# Patient Record
Sex: FEMALE | Race: WHITE | NOT HISPANIC OR LATINO | Employment: FULL TIME | URBAN - METROPOLITAN AREA
[De-identification: names, ages, dates, MRNs, and addresses within clinical notes are randomized per-mention and may not be internally consistent; named-entity substitution may affect disease eponyms.]

---

## 2017-04-07 ENCOUNTER — RADIANT APPOINTMENT (OUTPATIENT)
Dept: GENERAL RADIOLOGY | Facility: CLINIC | Age: 52
End: 2017-04-07
Attending: PHYSICIAN ASSISTANT
Payer: COMMERCIAL

## 2017-04-07 ENCOUNTER — OFFICE VISIT (OUTPATIENT)
Dept: URGENT CARE | Facility: URGENT CARE | Age: 52
End: 2017-04-07
Payer: COMMERCIAL

## 2017-04-07 VITALS
DIASTOLIC BLOOD PRESSURE: 74 MMHG | TEMPERATURE: 97.6 F | HEART RATE: 94 BPM | OXYGEN SATURATION: 97 % | SYSTOLIC BLOOD PRESSURE: 116 MMHG | WEIGHT: 171.8 LBS

## 2017-04-07 DIAGNOSIS — M25.571 ACUTE RIGHT ANKLE PAIN: Primary | ICD-10-CM

## 2017-04-07 DIAGNOSIS — M25.571 ACUTE RIGHT ANKLE PAIN: ICD-10-CM

## 2017-04-07 DIAGNOSIS — S93.401A SPRAIN OF RIGHT ANKLE, UNSPECIFIED LIGAMENT, INITIAL ENCOUNTER: ICD-10-CM

## 2017-04-07 PROCEDURE — 99213 OFFICE O/P EST LOW 20 MIN: CPT | Performed by: PHYSICIAN ASSISTANT

## 2017-04-07 PROCEDURE — 73610 X-RAY EXAM OF ANKLE: CPT | Mod: RT

## 2017-04-07 NOTE — NURSING NOTE
Chief Complaint   Patient presents with     Foot Problems     Rt ankle injury x this morning        Initial /74 (BP Location: Left arm, Patient Position: Chair, Cuff Size: Adult Regular)  Pulse 94  Temp 97.6  F (36.4  C) (Oral)  Wt 171 lb 12.8 oz (77.9 kg)  SpO2 97% There is no height or weight on file to calculate BMI.  Medication Reconciliation: complete

## 2017-04-07 NOTE — MR AVS SNAPSHOT
"              After Visit Summary   2017    Jodi Leiva    MRN: [id number]           Patient Information     Date Of Birth          1965        Visit Information        Provider Department      2017 4:30 PM Michele Gerber, FERMÍN [facility]        Today's Diagnoses     Acute right ankle pain    -  1    Sprain of right ankle, unspecified ligament, initial encounter           Follow-ups after your visit        Who to contact     If you have questions or need follow up information about today's clinic visit or your schedule please contact [facility] directly at [phone].  Normal or non-critical lab and imaging results will be communicated to you by [employer], letter or phone within 4 business days after the clinic has received the results. If you do not hear from us within 7 days, please contact the clinic through General [employer] or phone. If you have a critical or abnormal lab result, we will notify you by phone as soon as possible.  Submit refill requests through [employer] or call your pharmacy and they will forward the refill request to us. Please allow 3 business days for your refill to be completed.          Additional Information About Your Visit        MyChart Information     [employer] lets you send messages to your doctor, view your test results, renew your prescriptions, schedule appointments and more. To sign up, go to [email or web address] . Click on \"Log in\" on the left side of the screen, which will take you to the Welcome page. Then click on \"Sign up Now\" on the right side of the page.     You will be asked to enter the access code listed below, as well as some personal information. Please follow the directions to create your username and password.     Your access code is: [email or web address]  Expires: 2017  9:11 AM     Your access code will  in 90 days. If you need help or a new code, please call your [place] clinic or [phone].   "      Care EveryWhere ID     This is your Care EveryWhere ID. This could be used by other organizations to access your [place] medical records  [id number]        Your Vitals Were     Pulse Temperature Pulse Oximetry             94 97.6  F (36.4  C) (Oral) 97%          Blood Pressure from Last 3 Encounters:   04/07/17 116/74    Weight from Last 3 Encounters:   04/07/17 171 lb 12.8 oz (77.9 kg)                 Today's Medication Changes          These changes are accurate as of: 4/7/17 11:59 PM.  If you have any questions, ask your nurse or doctor.               Start taking these medicines.        Dose/Directions    order for DME   Used for:  Acute right ankle pain, Sprain of right ankle, unspecified ligament, initial encounter   Started by:  Michele Gerber PA-C        Equipment being ordered: right elastic ankle brace   Quantity:  1 Device   Refills:  0            Where to get your medicines      Some of these will need a paper prescription and others can be bought over the counter.  Ask your nurse if you have questions.     Bring a paper prescription for each of these medications     order for DME                Primary Care Provider Office Phone # Fax #    Kenneth G Pallas, [phone]       [facility] [address] [facility] [address]        Thank you!     Thank you for choosing [facility]  for your care. Our goal is always to provide you with excellent care. Hearing back from our patients is one way we can continue to improve our services. Please take a few minutes to complete the written survey that you may receive in the mail after your visit with us. Thank you!             Your Updated Medication List - Protect others around you: Learn how to safely use, store and throw away your medicines at www.disposemymeds.org.          This list is accurate as of: 4/7/17 11:59 PM.  Always use your most recent med list.                   Brand  Name Dispense Instructions for use    AMITRIPTYLINE HCL PO      Take 20 mg by mouth       CITALOPRAM HYDROBROMIDE PO      Take 10 mg by mouth       order for DME     1 Device    Equipment being ordered: right elastic ankle brace       ZANTAC PO

## 2017-04-09 NOTE — PROGRESS NOTES
SUBJECTIVE:  Chief Complaint   Patient presents with     Foot Problems     Rt ankle injury x this morning      Jodi Leiva is a 51 year old female presents with a chief complaint of right ankle pain.  The injury occurred 1 day(s) ago.   The injury happened while playing. How: trauma: immediate pain.  The patient complained of mild pain  and has had decreased ROM.  Pain exacerbated by weight-bearing and movement.  Relieved by rest.  She treated it initially with no therapy. This is the first time this type of injury has occurred to this patient.     PMH:  None    ALLERGIES  No Known Allergies   \  Social History   Substance Use Topics     Smoking status: Never Smoker     Smokeless tobacco: Not on file     Alcohol use Not on file       ROS:  CONSTITUTIONAL:NEGATIVE for fever, chills, change in weight  INTEGUMENTARY/SKIN: POSITIVE for right swelling of ankle  MUSCULOSKELETAL: Positive for right ankle tenderness, pain  NEURO: NEGATIVE for weakness, dizziness or paresthesias    EXAM:   /74 (BP Location: Left arm, Patient Position: Chair, Cuff Size: Adult Regular)  Pulse 94  Temp 97.6  F (36.4  C) (Oral)  Wt 171 lb 12.8 oz (77.9 kg)  SpO2 97%  Gen: healthy,alert,no distress  Extremity: ankle has point tenderness lateral and medial ankel.   There is not compromise to the distal circulation.  Pulses are +2 and CRT is brisk  GENERAL APPEARANCE: healthy, alert and no distress  EXTREMITIES: peripheral pulses normal  MS:  Positive for right ankle tenderness, localize dlain  SKIN: Positive for swelling lateral and medial ankle  NEURO: Normal strength and tone, sensory exam grossly normal, mentation intact and speech normal    X-RAY was done and negative for acute changes including fracture Xray read by Michele Gerber at time of visit    ASSESSMENT/PLAN:      ICD-10-CM    1. Acute right ankle pain M25.571 XR Ankle Right G/E 3 Views     order for DME   2. Sprain of right ankle, unspecified ligament, initial encounter  S93.401A order for DME     RICE treatment: Rest, Ice, compression, elevation   Wear ankle brace  Activity as tolerated

## 2019-12-03 ENCOUNTER — HOSPITAL ENCOUNTER (OUTPATIENT)
Dept: MAMMOGRAPHY | Facility: CLINIC | Age: 54
End: 2019-12-03
Attending: FAMILY MEDICINE
Payer: COMMERCIAL

## 2019-12-03 ENCOUNTER — HOSPITAL ENCOUNTER (OUTPATIENT)
Dept: MAMMOGRAPHY | Facility: CLINIC | Age: 54
Discharge: HOME | End: 2019-12-03
Attending: FAMILY MEDICINE | Admitting: FAMILY MEDICINE
Payer: COMMERCIAL

## 2019-12-03 DIAGNOSIS — N64.59 BREAST THICKENING: ICD-10-CM

## 2019-12-03 DIAGNOSIS — N64.4 BREAST PAIN, LEFT: ICD-10-CM

## 2019-12-03 PROCEDURE — G0279 TOMOSYNTHESIS, MAMMO: HCPCS

## 2019-12-03 PROCEDURE — 77066 DX MAMMO INCL CAD BI: CPT

## 2019-12-03 PROCEDURE — 76642 ULTRASOUND BREAST LIMITED: CPT | Mod: LT

## 2021-04-19 ENCOUNTER — IMMUNIZATION (OUTPATIENT)
Dept: NURSING | Facility: CLINIC | Age: 56
End: 2021-04-19
Payer: COMMERCIAL

## 2021-04-19 PROCEDURE — 91300 PR COVID VAC PFIZER DIL RECON 30 MCG/0.3 ML IM: CPT

## 2021-04-19 PROCEDURE — 0001A PR COVID VAC PFIZER DIL RECON 30 MCG/0.3 ML IM: CPT

## 2021-05-10 ENCOUNTER — IMMUNIZATION (OUTPATIENT)
Dept: NURSING | Facility: CLINIC | Age: 56
End: 2021-05-10
Attending: INTERNAL MEDICINE
Payer: COMMERCIAL

## 2021-05-10 PROCEDURE — 91300 PR COVID VAC PFIZER DIL RECON 30 MCG/0.3 ML IM: CPT

## 2021-05-10 PROCEDURE — 0002A PR COVID VAC PFIZER DIL RECON 30 MCG/0.3 ML IM: CPT

## 2021-05-16 ENCOUNTER — HEALTH MAINTENANCE LETTER (OUTPATIENT)
Age: 56
End: 2021-05-16

## 2021-09-05 ENCOUNTER — HEALTH MAINTENANCE LETTER (OUTPATIENT)
Age: 56
End: 2021-09-05

## 2021-12-15 ENCOUNTER — HOSPITAL ENCOUNTER (EMERGENCY)
Facility: CLINIC | Age: 56
Discharge: HOME | End: 2021-12-15
Attending: EMERGENCY MEDICINE | Admitting: EMERGENCY MEDICINE
Payer: COMMERCIAL

## 2021-12-15 VITALS
TEMPERATURE: 98 F | OXYGEN SATURATION: 99 % | DIASTOLIC BLOOD PRESSURE: 88 MMHG | RESPIRATION RATE: 18 BRPM | WEIGHT: 182.6 LBS | HEART RATE: 83 BPM | SYSTOLIC BLOOD PRESSURE: 123 MMHG

## 2021-12-15 DIAGNOSIS — H43.391 FLOATERS, RIGHT: ICD-10-CM

## 2021-12-15 PROCEDURE — 76512 OPH US DX B-SCAN: CPT | Performed by: EMERGENCY MEDICINE

## 2021-12-15 PROCEDURE — 76512 OPH US DX B-SCAN: CPT | Mod: 26 | Performed by: EMERGENCY MEDICINE

## 2021-12-15 PROCEDURE — 99284 EMERGENCY DEPT VISIT MOD MDM: CPT | Mod: 25 | Performed by: EMERGENCY MEDICINE

## 2021-12-15 ASSESSMENT — VISUAL ACUITY
OS: 20/30;WITH CORRECTIVE LENSES
OD: 20/40;WITH CORRECTIVE LENSES

## 2021-12-15 ASSESSMENT — ENCOUNTER SYMPTOMS
SORE THROAT: 0
FREQUENCY: 0
PALPITATIONS: 0
EYE ITCHING: 0
CHEST TIGHTNESS: 0
SHORTNESS OF BREATH: 0
ABDOMINAL DISTENTION: 0
CONFUSION: 0
COUGH: 0
ARTHRALGIAS: 0
FEVER: 0
NAUSEA: 0
CHILLS: 0
DIZZINESS: 0
ABDOMINAL PAIN: 0
HEADACHES: 0
EYE PAIN: 0
FATIGUE: 0
BACK PAIN: 0
EYE DISCHARGE: 0
NECK PAIN: 0
WEAKNESS: 0
MYALGIAS: 0
DYSURIA: 0
DIFFICULTY URINATING: 0
VOMITING: 0
DIARRHEA: 0
CONSTIPATION: 0
COLOR CHANGE: 0

## 2021-12-16 ENCOUNTER — OFFICE VISIT (OUTPATIENT)
Dept: OPHTHALMOLOGY | Facility: CLINIC | Age: 56
End: 2021-12-16
Attending: OPHTHALMOLOGY
Payer: COMMERCIAL

## 2021-12-16 ENCOUNTER — TELEPHONE (OUTPATIENT)
Dept: OPHTHALMOLOGY | Facility: CLINIC | Age: 56
End: 2021-12-16
Payer: COMMERCIAL

## 2021-12-16 DIAGNOSIS — H43.812 VITREOUS DEGENERATION, LEFT: Primary | ICD-10-CM

## 2021-12-16 PROCEDURE — G0463 HOSPITAL OUTPT CLINIC VISIT: HCPCS

## 2021-12-16 PROCEDURE — 99203 OFFICE O/P NEW LOW 30 MIN: CPT | Mod: GC | Performed by: OPHTHALMOLOGY

## 2021-12-16 ASSESSMENT — VISUAL ACUITY
OD_CC: 20/25
METHOD: SNELLEN - LINEAR
OS_CC: 20/25
OS_CC+: -2
OD_CC+: +2
OU: 1
CORRECTION_TYPE: GLASSES

## 2021-12-16 ASSESSMENT — CUP TO DISC RATIO
OD_RATIO: 0.2
OS_RATIO: 0.2

## 2021-12-16 ASSESSMENT — CONF VISUAL FIELD
METHOD: COUNTING FINGERS
OS_NORMAL: 1
OD_NORMAL: 1

## 2021-12-16 ASSESSMENT — REFRACTION_WEARINGRX
OD_CYLINDER: +1.50
OS_CYLINDER: +0.50
OS_SPHERE: +1.50
OS_ADD: +1.25
OD_ADD: +1.25
OS_AXIS: 013
OD_SPHERE: +1.00
SPECS_TYPE: PAL
OD_AXIS: 138

## 2021-12-16 ASSESSMENT — TONOMETRY
OD_IOP_MMHG: 17
OS_IOP_MMHG: 14
IOP_METHOD: TONOPEN

## 2021-12-16 ASSESSMENT — EXTERNAL EXAM - RIGHT EYE: OD_EXAM: NORMAL

## 2021-12-16 ASSESSMENT — EXTERNAL EXAM - LEFT EYE: OS_EXAM: NORMAL

## 2021-12-16 NOTE — DISCHARGE INSTRUCTIONS
You are being discharged with plan to follow-up in the ophthalmology clinic tomorrow morning. Their contact information is listed below. They should call you to schedule appointment. If you do not hear from them by 10 AM, you should call the number listed below.    Please follow up with Eye Clinic (phone: [phone]) as soon as possible.    [facility]  9th Floor  [place] [phone]

## 2021-12-16 NOTE — TELEPHONE ENCOUNTER
Called pt earlier this AM    Will document in previous encounter    German Duvall RN 8:47 AM 12/16/21          [facility]    Phone Message    May a detailed message be left on voicemail: yes     Reason for Call: Other: Pt called, was in E.R for floaters and is referred over to Eye Clinic to see a specialist. Per protocols, writer is sending TE to schedule Pt. Thank you.      Action Taken: Message routed to:  [facility]    Travel Screening: Not Applicable

## 2021-12-16 NOTE — ED PROVIDER NOTES
"      [facility] EMERGENCY DEPARTMENT ([facility])    12/15/21    ED04      History     Chief Complaint   Patient presents with     Floaters Right Eye     floater R eye for past week, mild eye pain. Every so often feels a \"ripple or curtain\" go across visual field, denied vision change in triage then said yes, vision is blurrier in R eye during visual acuity test.     The history is provided by the patient and medical records.     Jodi Leiva is a 56 year old female who presents with a floater in her right eye for the past few weeks.  She states that on Saturday 12/11/2021 she had a moment where the eye was sharply painful.  She states that the eye is tender and sore underneath it. She denies pain.  She also reports discomfort in the tear trough.  She states that nothing has affected its functionality.  She states that sometimes the floater looks like a curtain drifting across her vision which she can see in a bright environment such as looking at bright computer screen or in the shower.  She feels that her eyes are strained.  The floater is in the upper right part of the eye when looking forward.  She states that her vision is slightly blurry when she closes her left eye.  She denies use of contacts and wears glasses.  She denies blackening of her vision.  She denies drainage or itching.  She denies any chronic problems in that eye and states that this issue is new.  She denies any chronic medical problems.  She states the only medication she takes is an antidepressant (citalopram) and amitriptyline for sleeping.  She reports that she was seen at urgent care today and they told her to come to the ED when they heard about the \"curtain-like\" movements of the floater.        Past Medical History  No past medical history on file.  No past surgical history on file.  AMITRIPTYLINE HCL PO  CITALOPRAM HYDROBROMIDE PO  order for DME      No Known Allergies  Family History  Family History   Problem Relation Age of " Onset     Glaucoma No family hx of      Macular Degeneration No family hx of      Social History   Social History     Tobacco Use     Smoking status: Never Smoker     Smokeless tobacco: Never Used   Substance Use Topics     Alcohol use: Not on file     Drug use: Not on file      Past medical history, past surgical history, medications, allergies, family history, and social history were reviewed with the patient. No additional pertinent items.       Review of Systems   Constitutional: Negative for chills, fatigue and fever.   HENT: Negative for congestion and sore throat.    Eyes: Positive for visual disturbance. Negative for pain, discharge and itching.   Respiratory: Negative for cough, chest tightness and shortness of breath.    Cardiovascular: Negative for chest pain and palpitations.   Gastrointestinal: Negative for abdominal distention, abdominal pain, constipation, diarrhea, nausea and vomiting.   Genitourinary: Negative for difficulty urinating, dysuria, frequency and urgency.   Musculoskeletal: Negative for arthralgias, back pain, myalgias and neck pain.   Skin: Negative for color change and rash.   Neurological: Negative for dizziness, weakness and headaches.   Psychiatric/Behavioral: Negative for confusion.     A complete review of systems was performed with pertinent positives and negatives noted in the HPI, and all other systems negative.    Physical Exam   BP: 136/78  Pulse: 106  Temp: 97.9  F (36.6  C)  Resp: 16  Weight: 82.8 kg (182 lb 9.6 oz)  SpO2: 96 %  Physical Exam  Vitals and nursing note reviewed.   Constitutional:       General: She is not in acute distress.     Appearance: Normal appearance.   HENT:      Head: Normocephalic.      Nose: Nose normal.   Eyes:      General: Lids are normal. Lids are everted, no foreign bodies appreciated. Vision grossly intact. Visual field deficit present.         Right eye: No foreign body or discharge.      Extraocular Movements: Extraocular movements intact.       Conjunctiva/sclera:      Right eye: Right conjunctiva is not injected. No chemosis, exudate or hemorrhage.     Pupils: Pupils are equal, round, and reactive to light.      Right eye: Pupil is round, reactive and not sluggish. No corneal abrasion. Damaris exam negative.      Funduscopic exam:     Right eye: No exudate or papilledema.      Slit lamp exam:     Right eye: No hyphema, hypopyon or photophobia.      Visual Fields: Right eye visual fields normal.   Cardiovascular:      Rate and Rhythm: Normal rate and regular rhythm.   Pulmonary:      Effort: Pulmonary effort is normal.   Abdominal:      General: There is no distension.   Musculoskeletal:         General: No deformity. Normal range of motion.      Cervical back: Normal range of motion.   Skin:     General: Skin is warm.   Neurological:      Mental Status: She is alert and oriented to person, place, and time.   Psychiatric:         Mood and Affect: Mood normal.         ED Course     8:29 PM  The patient was seen and examined by Edmund Green DO in Room ED04.     Procedures  Results for orders placed during the hospital encounter of 12/15/21    POC US SOFT TISSUE    Impression  Limited Ocular Ultrasound, performed and interpreted by me.    Indication:  Floaters, Translucent Curtain    Body location: R eye    Findings:  No evidence of detached retina.  No obvious vitrious hemorrhage.  Optic nerve wnl.    IMPRESSION: No retinal detachment       The medical record was reviewed and interpreted.  Current images reviewed and interpreted: ocular ultrasound.              Results for orders placed or performed during the hospital encounter of 12/15/21   POC US SOFT TISSUE     Status: None    Impression    Limited Ocular Ultrasound, performed and interpreted by me.    Indication:  Floaters, Translucent Curtain    Body location: R eye    Findings:  No evidence of detached retina.  No obvious vitrious hemorrhage.  Optic nerve wnl.    IMPRESSION: No retinal  detachment         Medications - No data to display     Assessments & Plan (with Medical Decision Making)   Patient presents to the ED for evaluation of floaters in her right eye.  Also feels a translucent curtain over her eye when she looks into bright lights that started today.    Differential diagnosis includes retinal detachment, posterior vitreous detachment, vitreous hemorrhage    On arrival, patient is well-appearing.  She is not overly distressed.  She has 2030 vision in her left eye (corrected) and 20/40 vision in her right eye (corrected).  She does not endorse any subjective visual deficit.  She does not have any abdominal pain.  Her sclera is clear.  Conjunctive are clear.  Unlikely scleritis/conjunctivitis.    Ultrasound without any obvious evidence of retinal detachment or vitreous hemorrhage.  Funduscopic exam without papilledema.  Slit-lamp exam unremarkable, albeit nondilated.    Case discussed with the ophthalmology.  Given presentation and reassuring exam, unlikely retinal detachment.  They would like patient to be seen in the next 24 hours for dilated funduscopic exam.  Patient can be discharged.  We will follow-up in their office tomorrow morning.    I have reviewed the nursing notes. I have reviewed the findings, diagnosis, plan and need for follow up with the patient.    Discharge Medication List as of 12/15/2021  9:55 PM          Final diagnoses:   Floaters, right     I, Gale Posey, am serving as a trained medical scribe to document services personally performed by Edmund Green DO based on the provider's statements to me on December 15, 2021.  This document has been checked and approved by the attending provider.    I, Edmund Green DO, was physically present and have reviewed and verified the accuracy of this note documented by Gale Posey medical scribe.      Edmund Green DO      --  Edmund Green DO  [facility] EMERGENCY DEPARTMENT  12/15/2021     Edmund Green  Rashad,   12/16/21 2042

## 2021-12-16 NOTE — TELEPHONE ENCOUNTER
Spoke to pt at 0845    Pt with right eye floater and area of hazy in vision (seems to be in same area of vision).    Pt seen in ED last night    Scheduled with Dr. Bernal this AM at 0910    Pt aware of date/time/location at [facility]      German Duvall RN 8:54 AM 12/16/21            [phone] home/cell    Pt to f/u in clinic today per on call eye provider following ED visit last night for new floaters    Called and left message at 0811 with direct number to assist in scheduling    -- able to schedule with Dr. Escobar today (prefer AM)    German Duvall RN 8:13 AM 12/16/21

## 2021-12-16 NOTE — PROGRESS NOTES
CC -   Floaters right eye     INTERVAL HISTORY - Initial visit    PMH - Jodi Leiva is a  56 year old year-old patient with history of floaters in there right eye for the past few weeks. Initially a small floater, then a larger blob but has since been improving. No flashing lights. She had presented to the ED yesterday and had an ultrasound and asked to follow up today.   Denies DM or HTN.   Denies FH of glc    PAST OCULAR SURGERY: Denies    ASSESSMENT & PLAN    # PVD right eye  Retinal detachment/retinal tear precautions discussed with patient  Contact clinic immediately for new floaters/flashers or shadows in vision  Follow up 1 mo    # jojo ou  - following with PCP     return to clinic: 1 mo optos and Optical Coherence Tomography     Topher Pandey MD  Vitreoretinal Surgery Fellow  [facility]     ATTESTATION     Attending Attestation:     Complete documentation of historical and exam elements from today's encounter can be found in the full encounter summary report (not reduplicated in this progress note).  I personally obtained the chief complaint(s) and history of present illness.  I confirmed and edited as necessary the review of systems, past medical/surgical history, family history, social history, and examination findings as documented by others; and I examined the patient myself.  I personally reviewed the relevant tests, images, and reports as documented above.  I formulated and edited as necessary the assessment and plan and discussed the findings and management plan with the patient and family    Mia Bernal MD  , Vitreoretinal Surgery  Department of Ophthalmology  [facility]

## 2021-12-16 NOTE — NURSING NOTE
"Chief Complaints and History of Present Illnesses   Patient presents with     Floaters Right Eye     Chief Complaint(s) and History of Present Illness(es)     Floaters Right Eye               Comments     Pt here to follow up from ED visit last night.   New floater in RE x 2 weeks ago. No flashes of light.  Pt notes a \"clear curtain\" that floats around RE, pt sees this centrally in RE today.  RE feeling strained today, no eye pain. Some tearing from RE, but nothing major.    LESLIE Momin December 16, 2021 9:25 AM                    "

## 2022-01-12 DIAGNOSIS — H43.812 VITREOUS DEGENERATION, LEFT: Primary | ICD-10-CM

## 2022-01-13 ENCOUNTER — OFFICE VISIT (OUTPATIENT)
Dept: OPHTHALMOLOGY | Facility: CLINIC | Age: 57
End: 2022-01-13
Attending: OPHTHALMOLOGY
Payer: COMMERCIAL

## 2022-01-13 DIAGNOSIS — H43.812 VITREOUS DEGENERATION, LEFT: ICD-10-CM

## 2022-01-13 PROCEDURE — 92012 INTRM OPH EXAM EST PATIENT: CPT | Performed by: OPHTHALMOLOGY

## 2022-01-13 PROCEDURE — 92134 CPTRZ OPH DX IMG PST SGM RTA: CPT | Performed by: OPHTHALMOLOGY

## 2022-01-13 PROCEDURE — G0463 HOSPITAL OUTPT CLINIC VISIT: HCPCS

## 2022-01-13 ASSESSMENT — TONOMETRY
IOP_METHOD: TONOPEN
OS_IOP_MMHG: 15
OD_IOP_MMHG: 19

## 2022-01-13 ASSESSMENT — REFRACTION_WEARINGRX
OD_CYLINDER: +1.50
SPECS_TYPE: PAL
OD_AXIS: 138
OS_SPHERE: +1.50
OD_SPHERE: +1.00
OS_CYLINDER: +0.50
OD_ADD: +1.25
OS_ADD: +1.25
OS_AXIS: 013

## 2022-01-13 ASSESSMENT — VISUAL ACUITY
OS_CC: 20/20
OD_CC: 20/20
OS_CC+: -2
OD_CC+: -2
METHOD: SNELLEN - LINEAR
CORRECTION_TYPE: GLASSES

## 2022-01-13 ASSESSMENT — CUP TO DISC RATIO
OS_RATIO: 0.2
OD_RATIO: 0.2

## 2022-01-13 ASSESSMENT — EXTERNAL EXAM - LEFT EYE: OS_EXAM: NORMAL

## 2022-01-13 ASSESSMENT — EXTERNAL EXAM - RIGHT EYE: OD_EXAM: NORMAL

## 2022-01-13 ASSESSMENT — CONF VISUAL FIELD
METHOD: COUNTING FINGERS
OS_NORMAL: 1
OD_NORMAL: 1

## 2022-01-13 NOTE — NURSING NOTE
Chief Complaints and History of Present Illnesses   Patient presents with     Follow Up     Chief Complaint(s) and History of Present Illness(es)     Follow Up     Laterality: right eye    Associated symptoms: Negative for flashes and dryness    Treatments tried: artificial tears    Pain scale: 1/10              Comments     Follow up for PVD right eye.    The patient notes she still has the original floater in the right eye.  The patient notes she uses artificial tears as needed.  The patient notes she has a right eye ache with near reading and computer work.  Radha Guan, COA, COA 8:09 AM 01/13/2022

## 2022-01-13 NOTE — PROGRESS NOTES
CC -   Floaters right eye     INTERVAL HISTORY - follow up Posterior vitreous detachment (PVD)     PMH - Jodi Leiva is a  56 year old year-old patient with history of floaters in there right eye for the past few weeks. Initially a small floater, then a larger blob but has since been improving. No flashing lights.   Denies DM or HTN.   Denies FH of glc    PAST OCULAR SURGERY: Denies    ASSESSMENT & PLAN    # PVD right eye  Improving floaters  Retinal detachment/retinal tear precautions discussed with patient  Contact clinic immediately for new floaters/flashers or shadows in vision  Follow up 1 mo    # jojo ou  - following with PCP     return to clinic: 6 mo optos and Optical Coherence Tomography   ~~~~~~~~~~~~~~~~~~~~~~~~~~~~~~~~~~   Complete documentation of historical and exam elements from today's encounter can be found in the full encounter summary report (not reduplicated in this progress note).  I personally obtained the chief complaint(s) and history of present illness.  I confirmed and edited as necessary the review of systems, past medical/surgical history, family history, social history, and examination findings as documented by others; and I examined the patient myself.  I personally reviewed the relevant tests, images, and reports as documented above.  I formulated and edited as necessary the assessment and plan and discussed the findings and management plan with the patient and family    Mia Bernal MD   of Ophthalmology.  Retina Service   Department of Ophthalmology and Visual Neurosciences   [facility]  Phone: [phone]   Fax: [phone]

## 2022-02-20 ENCOUNTER — HEALTH MAINTENANCE LETTER (OUTPATIENT)
Age: 57
End: 2022-02-20

## 2022-06-12 ENCOUNTER — HEALTH MAINTENANCE LETTER (OUTPATIENT)
Age: 57
End: 2022-06-12

## 2022-06-24 ENCOUNTER — TELEPHONE (OUTPATIENT)
Dept: OPHTHALMOLOGY | Facility: CLINIC | Age: 57
End: 2022-06-24

## 2022-06-24 ENCOUNTER — OFFICE VISIT (OUTPATIENT)
Dept: OPHTHALMOLOGY | Facility: CLINIC | Age: 57
End: 2022-06-24
Attending: OPHTHALMOLOGY
Payer: COMMERCIAL

## 2022-06-24 DIAGNOSIS — H35.462: Primary | ICD-10-CM

## 2022-06-24 DIAGNOSIS — H43.392 FLOATERS, LEFT: ICD-10-CM

## 2022-06-24 DIAGNOSIS — H02.63 XANTHELASMA OF EYELID, BILATERAL: ICD-10-CM

## 2022-06-24 DIAGNOSIS — H02.66 XANTHELASMA OF EYELID, BILATERAL: ICD-10-CM

## 2022-06-24 PROCEDURE — G0463 HOSPITAL OUTPT CLINIC VISIT: HCPCS

## 2022-06-24 PROCEDURE — 92014 COMPRE OPH EXAM EST PT 1/>: CPT | Performed by: OPHTHALMOLOGY

## 2022-06-24 RX ORDER — ROSUVASTATIN CALCIUM 5 MG/1
TABLET, COATED ORAL
COMMUNITY
Start: 2022-05-10

## 2022-06-24 ASSESSMENT — CUP TO DISC RATIO
OD_RATIO: 0.2
OS_RATIO: 0.2

## 2022-06-24 ASSESSMENT — REFRACTION_WEARINGRX
OS_SPHERE: +1.50
OD_AXIS: 138
OS_CYLINDER: +0.50
SPECS_TYPE: PAL
OS_ADD: +1.25
OS_AXIS: 013
OD_CYLINDER: +1.50
OD_ADD: +1.25
OD_SPHERE: +1.00

## 2022-06-24 ASSESSMENT — CONF VISUAL FIELD
OS_NORMAL: 1
OD_NORMAL: 1
METHOD: COUNTING FINGERS

## 2022-06-24 ASSESSMENT — VISUAL ACUITY
OS_CC: 20/20
OS_CC+: -2
METHOD: SNELLEN - LINEAR
OD_CC+: -1+1
CORRECTION_TYPE: GLASSES
OD_CC: 20/25

## 2022-06-24 ASSESSMENT — TONOMETRY
OS_IOP_MMHG: 15
OD_IOP_MMHG: 16
IOP_METHOD: TONOPEN

## 2022-06-24 ASSESSMENT — EXTERNAL EXAM - RIGHT EYE: OD_EXAM: NORMAL

## 2022-06-24 ASSESSMENT — EXTERNAL EXAM - LEFT EYE: OS_EXAM: NORMAL

## 2022-06-24 NOTE — NURSING NOTE
Chief Complaints and History of Present Illnesses   Patient presents with     Floaters Both Eyes     Chief Complaint(s) and History of Present Illness(es)     Floaters Both Eyes               Comments     Pt states no change in VA since last visit  Pt states she is having new floaters in the left eye, right eye floaters same as last visit   Pt states she might have some flashes only morning  No eye pain or redness    Tosah Richter COT 12:52 PM June 24, 2022

## 2022-06-24 NOTE — TELEPHONE ENCOUNTER
New cobweb like floaters and questionable quick flashing in left eye times two days    H/o right eye posterior vitreous detachment    Scheduled with Dr. Peterson at 1245 today    Pt aware of date/time/location at [facility]    German Duvall RN 8:28 AM 06/24/22          [facility] Call [facility]    Phone Message    May a detailed message be left on voicemail: yes     Reason for Call: Symptoms or Concerns     If patient has red-flag symptoms, warm transfer to triage line    Current symptom or concern: Pt believes she has developed PVD in her Lt eye, with a new floater and some faint rippling.    Symptoms have been present for:  2 day(s)    Has patient previously been seen for this? No    By : Dr. Bernal    Date: 1/13/22    Are there any new or worsening symptoms? Yes: This is new for the Lt eye.      Action Taken: Message routed to:  [facility] E[facility]    Travel Screening: Not Applicable

## 2022-06-26 NOTE — PROGRESS NOTES
CC -   Floaters left eye    HPI     Pt states no change in VA since last visit  Pt states she is having new floaters in the left eye, right eye floaters same as last visit   Pt states she might have some flashes only morning  No eye pain or redness    Tosha Richter COT 12:52 PM June 24, 2022           Last edited by Tosha Richter on 6/24/2022 12:52 PM. (History)           PMH - Jodi Leiva is a  56 year old patient with history of floaters PVD right eye now with new floaters left eye. Notes occasional flash when looking side to side  .   Denies DM or HTN.   Denies FH of glc    PAST OCULAR SURGERY: Denies    ASSESSMENT & PLAN  # Vitreous floater left eye  With two small retinal racquel in far periphery left eye without subretinal fluid  No hole or tear noted  Do not believe at great risk for imminent Retinal detachment /retinal hole, however given that she can facilitate photopsias with rapid eye movement, may consider prophylactic laser  Will refer to back to Dr. Bernal to consider laser, complete 6 months exam with optos and OCT as desired from 1/13/22 office visit    # PVD right eye    # xanthlesma ou  - following with PCP     Return in 1 week (on 7/1/2022) for Oto 1-2 weeks forpossible laser retinopexy left eye, cystic retinal tuft, mac oct, optos.    Attending Physician Attestation:  Complete documentation of historical and exam elements from today's encounter can be found in the full encounter summary report (not reduplicated in this progress note).  I personally obtained the chief complaint(s) and history of present illness.  I confirmed and edited as necessary the review of systems, past medical/surgical history, family history, social history, and examination findings as documented by others; and I examined the patient myself.  I personally reviewed the relevant tests, images, and reports as documented above.  I formulated and edited as necessary the assessment and plan and discussed the findings and  management plan with the patient and family. - Franko Rodgers MD

## 2022-06-30 ENCOUNTER — OFFICE VISIT (OUTPATIENT)
Dept: OPHTHALMOLOGY | Facility: CLINIC | Age: 57
End: 2022-06-30
Attending: OPHTHALMOLOGY
Payer: COMMERCIAL

## 2022-06-30 DIAGNOSIS — H35.462: Primary | ICD-10-CM

## 2022-06-30 PROCEDURE — 92250 FUNDUS PHOTOGRAPHY W/I&R: CPT | Performed by: OPHTHALMOLOGY

## 2022-06-30 PROCEDURE — 92134 CPTRZ OPH DX IMG PST SGM RTA: CPT | Performed by: OPHTHALMOLOGY

## 2022-06-30 PROCEDURE — G0463 HOSPITAL OUTPT CLINIC VISIT: HCPCS | Mod: 25

## 2022-06-30 PROCEDURE — 92012 INTRM OPH EXAM EST PATIENT: CPT | Performed by: OPHTHALMOLOGY

## 2022-06-30 PROCEDURE — 99207 FUNDUS PHOTOS OU (BOTH EYES): CPT | Mod: 26 | Performed by: OPHTHALMOLOGY

## 2022-06-30 ASSESSMENT — REFRACTION_WEARINGRX
OD_CYLINDER: +1.50
OS_ADD: +1.25
OD_AXIS: 138
OS_SPHERE: +1.50
SPECS_TYPE: PAL
OD_ADD: +1.25
OD_SPHERE: +1.00
OS_AXIS: 013
OS_CYLINDER: +0.50

## 2022-06-30 ASSESSMENT — EXTERNAL EXAM - RIGHT EYE: OD_EXAM: NORMAL

## 2022-06-30 ASSESSMENT — VISUAL ACUITY
OD_CC: 20/25
CORRECTION_TYPE: GLASSES
OS_CC+: -2
METHOD: SNELLEN - LINEAR
OS_CC: 20/20
OD_CC+: -1

## 2022-06-30 ASSESSMENT — EXTERNAL EXAM - LEFT EYE: OS_EXAM: NORMAL

## 2022-06-30 ASSESSMENT — TONOMETRY
OS_IOP_MMHG: 17
IOP_METHOD: TONOPEN
OD_IOP_MMHG: 19

## 2022-06-30 ASSESSMENT — CONF VISUAL FIELD
OS_NORMAL: 1
OD_NORMAL: 1
METHOD: COUNTING FINGERS

## 2022-06-30 ASSESSMENT — CUP TO DISC RATIO
OD_RATIO: 0.2
OS_RATIO: 0.2

## 2022-06-30 NOTE — NURSING NOTE
Chief Complaints and History of Present Illnesses   Patient presents with     Retinal Evaluation     Cystic retinal tuft, left eye     Chief Complaint(s) and History of Present Illness(es)     Retinal Evaluation     Comments: Cystic retinal tuft, left eye              Comments     Pt states no change in VA since last visit  Pt states she is having new floaters in the left eye, right eye usually morning and night  No eye pain or redness    Tosha Richter COT 10:26 AM June 30, 2022

## 2022-06-30 NOTE — PROGRESS NOTES
CC -   Floaters right eye     INTERVAL HISTORY - follow up Posterior vitreous detachment (PVD) left eye     PMH - Jodi Leiva is a  56 year old year-old patient with flashes and floaters left eye  for the past few week. Denies DM or HTN.   Denies FH of glc    PAST OCULAR SURGERY: Denies    IMAGING  Optical Coherence Tomography 06/30/22 within normal limits good foveal contour both eyes   Optos: consistent with exam  Autofluorescence: normal both eyes     ASSESSMENT & PLAN    # PVD both eyes   New symptoms left eye for the past week with flashes and floaters   Improving floaters  Retinal detachment/retinal tear precautions discussed with patient  Contact clinic immediately for new floaters/flashers or shadows in vision  Follow up 4 months    # Cataracts both eyes   Observe for now    # xathlesma ou  - following with PCP     return to clinic: 4 mo optos and Optical Coherence Tomography; dilation  ~~~~~~~~~~~~~~~~~~~~~~~~~~~~~~~~~~   Complete documentation of historical and exam elements from today's encounter can be found in the full encounter summary report (not reduplicated in this progress note).  I personally obtained the chief complaint(s) and history of present illness.  I confirmed and edited as necessary the review of systems, past medical/surgical history, family history, social history, and examination findings as documented by others; and I examined the patient myself.  I personally reviewed the relevant tests, images, and reports as documented above.  I formulated and edited as necessary the assessment and plan and discussed the findings and management plan with the patient and family    Mia Bernal MD   of Ophthalmology.  Retina Service   Department of Ophthalmology and Visual Neurosciences   [facility]  Phone: [phone]   Fax: [phone]

## 2022-10-23 ENCOUNTER — HEALTH MAINTENANCE LETTER (OUTPATIENT)
Age: 57
End: 2022-10-23

## 2023-08-22 ENCOUNTER — HOSPITAL ENCOUNTER (OUTPATIENT)
Dept: MAMMOGRAPHY | Facility: CLINIC | Age: 58
Discharge: HOME | End: 2023-08-22
Attending: FAMILY MEDICINE | Admitting: FAMILY MEDICINE
Payer: COMMERCIAL

## 2023-08-22 DIAGNOSIS — Z12.39 ENCOUNTER FOR SCREENING FOR MALIGNANT NEOPLASM OF BREAST, UNSPECIFIED SCREENING MODALITY: ICD-10-CM

## 2023-08-22 DIAGNOSIS — Z12.31 BREAST CANCER SCREENING BY MAMMOGRAM: ICD-10-CM

## 2023-08-22 PROCEDURE — 77067 SCR MAMMO BI INCL CAD: CPT

## 2023-11-07 ENCOUNTER — APPOINTMENT (OUTPATIENT)
Dept: ULTRASOUND IMAGING | Facility: CLINIC | Age: 58
End: 2023-11-07
Attending: EMERGENCY MEDICINE
Payer: COMMERCIAL

## 2023-11-07 ENCOUNTER — HOSPITAL ENCOUNTER (EMERGENCY)
Facility: CLINIC | Age: 58
Discharge: HOME | End: 2023-11-08
Attending: EMERGENCY MEDICINE | Admitting: EMERGENCY MEDICINE
Payer: COMMERCIAL

## 2023-11-07 DIAGNOSIS — K80.50 BILIARY COLIC: Primary | ICD-10-CM

## 2023-11-07 DIAGNOSIS — R14.0 ABDOMINAL BLOATING: ICD-10-CM

## 2023-11-07 DIAGNOSIS — K80.20 GALLSTONES: ICD-10-CM

## 2023-11-07 LAB
ALBUMIN SERPL BCG-MCNC: 4.5 G/DL (ref 3.5–5.2)
ALBUMIN UR-MCNC: NEGATIVE MG/DL
ALP SERPL-CCNC: 144 U/L (ref 35–104)
ALT SERPL W P-5'-P-CCNC: 85 U/L (ref 0–50)
ANION GAP SERPL CALCULATED.3IONS-SCNC: 13 MMOL/L (ref 7–15)
APPEARANCE UR: CLEAR
AST SERPL W P-5'-P-CCNC: 44 U/L (ref 0–45)
ATRIAL RATE - MUSE: 111 BPM
BACTERIA #/AREA URNS HPF: ABNORMAL /HPF
BASOPHILS # BLD AUTO: 0 10E3/UL (ref 0–0.2)
BASOPHILS NFR BLD AUTO: 0 %
BILIRUB SERPL-MCNC: 0.3 MG/DL
BILIRUB UR QL STRIP: NEGATIVE
BUN SERPL-MCNC: 13.2 MG/DL (ref 8–23)
CALCIUM SERPL-MCNC: 8.3 MG/DL (ref 8.6–10)
CHLORIDE SERPL-SCNC: 103 MMOL/L (ref 98–107)
COLOR UR AUTO: ABNORMAL
CREAT SERPL-MCNC: 0.91 MG/DL (ref 0.51–0.95)
DEPRECATED HCO3 PLAS-SCNC: 26 MMOL/L (ref 22–29)
DIASTOLIC BLOOD PRESSURE - MUSE: NORMAL MMHG
EGFRCR SERPLBLD CKD-EPI 2021: 72 ML/MIN/1.73M2
EOSINOPHIL # BLD AUTO: 0.2 10E3/UL (ref 0–0.7)
EOSINOPHIL NFR BLD AUTO: 3 %
ERYTHROCYTE [DISTWIDTH] IN BLOOD BY AUTOMATED COUNT: 13.2 % (ref 10–15)
GLUCOSE SERPL-MCNC: 117 MG/DL (ref 70–99)
GLUCOSE UR STRIP-MCNC: NEGATIVE MG/DL
HCT VFR BLD AUTO: 40.1 % (ref 35–47)
HGB BLD-MCNC: 12.6 G/DL (ref 11.7–15.7)
HGB UR QL STRIP: NEGATIVE
HOLD SPECIMEN: NORMAL
IMM GRANULOCYTES # BLD: 0 10E3/UL
IMM GRANULOCYTES NFR BLD: 0 %
INTERPRETATION ECG - MUSE: NORMAL
KETONES UR STRIP-MCNC: ABNORMAL MG/DL
LEUKOCYTE ESTERASE UR QL STRIP: NEGATIVE
LIPASE SERPL-CCNC: 19 U/L (ref 13–60)
LYMPHOCYTES # BLD AUTO: 1.4 10E3/UL (ref 0.8–5.3)
LYMPHOCYTES NFR BLD AUTO: 19 %
MCH RBC QN AUTO: 28.4 PG (ref 26.5–33)
MCHC RBC AUTO-ENTMCNC: 31.4 G/DL (ref 31.5–36.5)
MCV RBC AUTO: 90 FL (ref 78–100)
MONOCYTES # BLD AUTO: 0.6 10E3/UL (ref 0–1.3)
MONOCYTES NFR BLD AUTO: 8 %
MUCOUS THREADS #/AREA URNS LPF: PRESENT /LPF
NEUTROPHILS # BLD AUTO: 4.9 10E3/UL (ref 1.6–8.3)
NEUTROPHILS NFR BLD AUTO: 70 %
NITRATE UR QL: NEGATIVE
NRBC # BLD AUTO: 0 10E3/UL
NRBC BLD AUTO-RTO: 0 /100
P AXIS - MUSE: 55 DEGREES
PH UR STRIP: 5 [PH] (ref 5–7)
PLATELET # BLD AUTO: 233 10E3/UL (ref 150–450)
POTASSIUM SERPL-SCNC: 3.7 MMOL/L (ref 3.4–5.3)
PR INTERVAL - MUSE: 162 MS
PROT SERPL-MCNC: 7.9 G/DL (ref 6.4–8.3)
QRS DURATION - MUSE: 80 MS
QT - MUSE: 328 MS
QTC - MUSE: 446 MS
R AXIS - MUSE: 46 DEGREES
RBC # BLD AUTO: 4.44 10E6/UL (ref 3.8–5.2)
RBC URINE: 0 /HPF
SODIUM SERPL-SCNC: 142 MMOL/L (ref 135–145)
SP GR UR STRIP: 1.01 (ref 1–1.03)
SYSTOLIC BLOOD PRESSURE - MUSE: NORMAL MMHG
T AXIS - MUSE: 58 DEGREES
T4 FREE SERPL-MCNC: 2.35 NG/DL (ref 0.9–1.7)
TROPONIN T SERPL HS-MCNC: <6 NG/L
TSH SERPL DL<=0.005 MIU/L-ACNC: 0.13 UIU/ML (ref 0.3–4.2)
UROBILINOGEN UR STRIP-MCNC: NORMAL MG/DL
VENTRICULAR RATE- MUSE: 111 BPM
WBC # BLD AUTO: 7.1 10E3/UL (ref 4–11)
WBC URINE: 1 /HPF

## 2023-11-07 PROCEDURE — 99285 EMERGENCY DEPT VISIT HI MDM: CPT | Mod: 25

## 2023-11-07 PROCEDURE — 84439 ASSAY OF FREE THYROXINE: CPT | Performed by: EMERGENCY MEDICINE

## 2023-11-07 PROCEDURE — 83690 ASSAY OF LIPASE: CPT | Performed by: EMERGENCY MEDICINE

## 2023-11-07 PROCEDURE — 258N000003 HC RX IP 258 OP 636: Performed by: EMERGENCY MEDICINE

## 2023-11-07 PROCEDURE — 80053 COMPREHEN METABOLIC PANEL: CPT | Performed by: EMERGENCY MEDICINE

## 2023-11-07 PROCEDURE — 84484 ASSAY OF TROPONIN QUANT: CPT | Performed by: EMERGENCY MEDICINE

## 2023-11-07 PROCEDURE — 81001 URINALYSIS AUTO W/SCOPE: CPT | Performed by: EMERGENCY MEDICINE

## 2023-11-07 PROCEDURE — 85025 COMPLETE CBC W/AUTO DIFF WBC: CPT | Performed by: EMERGENCY MEDICINE

## 2023-11-07 PROCEDURE — 84443 ASSAY THYROID STIM HORMONE: CPT | Performed by: EMERGENCY MEDICINE

## 2023-11-07 PROCEDURE — 96360 HYDRATION IV INFUSION INIT: CPT | Mod: 59

## 2023-11-07 PROCEDURE — 93005 ELECTROCARDIOGRAM TRACING: CPT

## 2023-11-07 PROCEDURE — 96361 HYDRATE IV INFUSION ADD-ON: CPT

## 2023-11-07 PROCEDURE — 76705 ECHO EXAM OF ABDOMEN: CPT

## 2023-11-07 PROCEDURE — 36415 COLL VENOUS BLD VENIPUNCTURE: CPT | Performed by: EMERGENCY MEDICINE

## 2023-11-07 RX ORDER — LEVOTHYROXINE SODIUM 125 UG/1
125 TABLET ORAL
COMMUNITY
Start: 2023-09-28

## 2023-11-07 RX ADMIN — SODIUM CHLORIDE 1000 ML: 9 INJECTION, SOLUTION INTRAVENOUS at 19:43

## 2023-11-07 ASSESSMENT — ACTIVITIES OF DAILY LIVING (ADL): ADLS_ACUITY_SCORE: 33

## 2023-11-08 VITALS
OXYGEN SATURATION: 99 % | SYSTOLIC BLOOD PRESSURE: 132 MMHG | DIASTOLIC BLOOD PRESSURE: 75 MMHG | HEART RATE: 90 BPM | TEMPERATURE: 98.1 F | RESPIRATION RATE: 18 BRPM

## 2023-11-08 NOTE — ED TRIAGE NOTES
"Pt sent from  after presenting  with \"bloat and stomach acid/reflux\" abdominal pain radiating to L side of back x 1 week. Pt denies CP, n/v/d. Pt states upper abdominal pain 4/10. Sinus tachy at  today. Pt had thyroid removed 9/28 and is now on levothyroxine. Since surgery, pt reports feelings of anxiety, some dizziness.        "

## 2023-11-08 NOTE — ED PROVIDER NOTES
History   Chief Complaint:  Abdominal Pain and Tachycardia     HPI   Jodi Leiva is a 58 year old female with PMH of thyroidectomy who presents with upper abdominal bloating and lower thoracic or upper lumbar back discomfort.  Patient notes she has had multiple events over the last week or so.  She did note last Thursday she had an episode lasting about an hour where she felt bloated to the upper abdomen and had some lower thoracic back pain at that same time.  She denies any chest pain or shortness of breath.  Symptoms seem to resolve without any intervention.  She had a repeat episode on Sunday of this week and ultimately when the symptoms occurred again at 2 PM this afternoon she ultimately went to urgent care.  She was found to have a fast heart rate at urgent care and so was referred to the emergency department for further work-up.  She denies chest pain and shortness of breath.  She did note that the symptoms lasted approximately an hour but she is not having any active abdominal pain nor nausea or vomiting.  She has been having regular bowel movements and passing gas.  She denies any urinary complaints.  Notes no prior abdominal surgeries historically.  Does have a history of gallstones from a CT scan of her chest seen in April 2023.  She did recover well from her recent thyroidectomy and is on thyroid replacement at this time.  She denies any palpitations but has noted over the last week she had a slightly higher heart rate.  Denies fever or constitutional symptoms.    Independent Historian:   None - Patient Only    Medications:    levothyroxine (SYNTHROID/LEVOTHROID) 125 MCG tablet  AMITRIPTYLINE HCL PO  CITALOPRAM HYDROBROMIDE PO  minoxidil (ROGAINE) 2 % external solution  order for DME  rosuvastatin (CRESTOR) 5 MG tablet      Past Medical History:    No past medical history on file.  Past Surgical History:    No past surgical history on file.   Physical Exam   Patient Vitals for the past 24 hrs:   BP  Temp Temp src Pulse Resp SpO2   11/07/23 2330 128/81 -- -- 95 18 95 %   11/07/23 2200 -- -- -- 107 14 98 %   11/07/23 1937 (!) 147/104 98.1  F (36.7  C) Oral (!) 130 16 97 %   11/07/23 1936 (!) 147/104 97  F (36.1  C) Temporal (!) 130 18 96 %      General: Alert, appears well-developed and well-nourished. Cooperative.     In mild distress  HEENT:  Head:  Atraumatic  Ears:  External ears are normal  Mouth/Throat:  Oropharynx is without erythema or exudate and mucous membranes are moist.   Eyes:   Conjunctivae normal and EOM are normal. No scleral icterus.  CV:  Tachycardic rate, regular rhythm, normal heart sounds and radial pulses are 2+ and symmetric.  No murmur.  Resp:  Breath sounds are clear bilaterally    Non-labored, no retractions or accessory muscle use  GI:  Abdomen is soft, no distension, no tenderness. No rebound or guarding.  No CVA tenderness bilaterally  MS:  Normal range of motion. No edema.    Normal strength in all 4 extremities.     Back atraumatic.    No midline cervical, thoracic, or lumbar tenderness  Skin:  Warm and dry.  No rash or lesions noted.  Neuro:   Alert. Normal strength.  GCS: 15  Psych: Normal mood and affect.    Emergency Department Course   EKG:  ECG results from 11/07/23   EKG 12-lead, tracing only     Value    Systolic Blood Pressure     Diastolic Blood Pressure     Ventricular Rate 111    Atrial Rate 111    MS Interval 162    QRS Duration 80        QTc 446    P Axis 55    R AXIS 46    T Axis 58    Interpretation ECG      Sinus tachycardia  Otherwise normal ECG  Compared to 11/7/23 1635, no significant changes  Taken 1931, Read 2013      Imaging:  US Abdomen Limited (RUQ)   Final Result   IMPRESSION:   1.  Cholelithiasis with multiple large gallstones in the gallbladder but nothing definite for cholecystitis.      2.  No biliary dilatation.      3.  Otherwise negative right upper quadrant ultrasound.               Report per radiology    Laboratory:  Labs Ordered and  Resulted from Time of ED Arrival to Time of ED Departure   COMPREHENSIVE METABOLIC PANEL - Abnormal       Result Value    Sodium 142      Potassium 3.7      Carbon Dioxide (CO2) 26      Anion Gap 13      Urea Nitrogen 13.2      Creatinine 0.91      GFR Estimate 72      Calcium 8.3 (*)     Chloride 103      Glucose 117 (*)     Alkaline Phosphatase 144 (*)     AST 44      ALT 85 (*)     Protein Total 7.9      Albumin 4.5      Bilirubin Total 0.3     ROUTINE UA WITH MICROSCOPIC REFLEX TO CULTURE - Abnormal    Color Urine Straw      Appearance Urine Clear      Glucose Urine Negative      Bilirubin Urine Negative      Ketones Urine Trace (*)     Specific Gravity Urine 1.013      Blood Urine Negative      pH Urine 5.0      Protein Albumin Urine Negative      Urobilinogen Urine Normal      Nitrite Urine Negative      Leukocyte Esterase Urine Negative      Bacteria Urine Few (*)     Mucus Urine Present (*)     RBC Urine 0      WBC Urine 1     CBC WITH PLATELETS AND DIFFERENTIAL - Abnormal    WBC Count 7.1      RBC Count 4.44      Hemoglobin 12.6      Hematocrit 40.1      MCV 90      MCH 28.4      MCHC 31.4 (*)     RDW 13.2      Platelet Count 233      % Neutrophils 70      % Lymphocytes 19      % Monocytes 8      % Eosinophils 3      % Basophils 0      % Immature Granulocytes 0      NRBCs per 100 WBC 0      Absolute Neutrophils 4.9      Absolute Lymphocytes 1.4      Absolute Monocytes 0.6      Absolute Eosinophils 0.2      Absolute Basophils 0.0      Absolute Immature Granulocytes 0.0      Absolute NRBCs 0.0     TSH WITH FREE T4 REFLEX - Abnormal    TSH 0.13 (*)    T4 FREE - Abnormal    Free T4 2.35 (*)    TROPONIN T, HIGH SENSITIVITY - Normal    Troponin T, High Sensitivity <6     LIPASE - Normal    Lipase 19          Procedures     Emergency Department Course & Assessments:       Interventions:  Medications   sodium chloride 0.9% BOLUS 1,000 mL (0 mLs Intravenous Stopped 11/7/23 0367)        Independent Interpretation  (X-rays, CTs, rhythm strip):  None    Consultations/Discussion of Management or Tests:  None        Social Determinants of Health affecting care:   None    Disposition:  The patient was discharged to home.     Impression & Plan    CMS Diagnoses: None    Medical Decision Making:  Patient is a 58-year-old female who presents with abdominal bloating in a bandlike distribution across the upper abdomen as well as radiation into the thoracic back.  She denies any chest pain or shortness of breath.  She did arrive tachycardic with sinus tachycardia in the 130s on arrival.  EKG showed no concerning ischemic changes.  Troponin study was undetectable and low suspicion for ACS.  Low suspicion for ACS.  Normal lung sounds bilaterally.  Normal cardiac sounds except for tachycardia as noted above.  Thankfully remainder of laboratory work relatively unremarkable.  Urinalysis shows no sign of infection.  CBC normal.  Electrolytes normal.  Normal renal function.  Faint alkaline phosphatase and ALT elevation, although high normal.  High suspicion her symptoms could be contributed from gallstones or biliary colic.  Right upper quadrant ultrasound obtained showing cholelithiasis with multiple large gallstones in the gallbladder, but no definitive evidence of cholecystitis.  No biliary dilatation.  Negative right upper quadrant ultrasound.  I do have high suspicion that since her symptoms seem to be related to food ingestion particularly after greasy pizza meal that she should stay away from high fatty or greasy foods in the near future and plan to establish with [facility] for an outpatient consultation for possible cholecystectomy.  Patient is agreeable to outpatient surgical consultation.  Of note she did have a recent thyroidectomy and is on levothyroxine.  Her free T4 is mildly elevated and this could explain some of the tachycardia although after IV fluids here in the emergency department tachycardia has fully  resolved.  She does have follow-up with her endocrinologist on 5 December.  No indication for advanced CT imaging at this time as patient is not having any active abdominal or chest discomfort symptoms.  After return precautions understood and all questions answered, discharged home    Diagnosis:    ICD-10-CM    1. Biliary colic  K80.50       2. Abdominal bloating  R14.0       3. Gallstones  K80.20            Discharge Medications:  New Prescriptions    No medications on file      11/7/2023   Meng Lane MD White, Scott, MD  11/08/23 0015

## 2023-11-14 ENCOUNTER — HOSPITAL ENCOUNTER (EMERGENCY)
Facility: CLINIC | Age: 58
Discharge: HOME | End: 2023-11-14
Attending: EMERGENCY MEDICINE | Admitting: EMERGENCY MEDICINE
Payer: COMMERCIAL

## 2023-11-14 VITALS
BODY MASS INDEX: 28.93 KG/M2 | HEART RATE: 93 BPM | RESPIRATION RATE: 15 BRPM | SYSTOLIC BLOOD PRESSURE: 131 MMHG | DIASTOLIC BLOOD PRESSURE: 88 MMHG | TEMPERATURE: 98 F | HEIGHT: 66 IN | OXYGEN SATURATION: 97 % | WEIGHT: 180 LBS

## 2023-11-14 DIAGNOSIS — R00.0 SINUS TACHYCARDIA: ICD-10-CM

## 2023-11-14 LAB
ANION GAP SERPL CALCULATED.3IONS-SCNC: 11 MMOL/L (ref 7–15)
ATRIAL RATE - MUSE: 117 BPM
BASOPHILS # BLD AUTO: 0.1 10E3/UL (ref 0–0.2)
BASOPHILS NFR BLD AUTO: 1 %
BUN SERPL-MCNC: 13.5 MG/DL (ref 8–23)
CALCIUM SERPL-MCNC: 8.7 MG/DL (ref 8.6–10)
CHLORIDE SERPL-SCNC: 103 MMOL/L (ref 98–107)
CREAT SERPL-MCNC: 0.88 MG/DL (ref 0.51–0.95)
D DIMER PPP FEU-MCNC: 0.33 UG/ML FEU (ref 0–0.5)
DEPRECATED HCO3 PLAS-SCNC: 24 MMOL/L (ref 22–29)
DIASTOLIC BLOOD PRESSURE - MUSE: NORMAL MMHG
EGFRCR SERPLBLD CKD-EPI 2021: 75 ML/MIN/1.73M2
EOSINOPHIL # BLD AUTO: 0.1 10E3/UL (ref 0–0.7)
EOSINOPHIL NFR BLD AUTO: 1 %
ERYTHROCYTE [DISTWIDTH] IN BLOOD BY AUTOMATED COUNT: 13 % (ref 10–15)
GLUCOSE SERPL-MCNC: 129 MG/DL (ref 70–99)
HCT VFR BLD AUTO: 40.9 % (ref 35–47)
HGB BLD-MCNC: 13.3 G/DL (ref 11.7–15.7)
IMM GRANULOCYTES # BLD: 0 10E3/UL
IMM GRANULOCYTES NFR BLD: 0 %
INTERPRETATION ECG - MUSE: NORMAL
LYMPHOCYTES # BLD AUTO: 1.1 10E3/UL (ref 0.8–5.3)
LYMPHOCYTES NFR BLD AUTO: 12 %
MCH RBC QN AUTO: 28.8 PG (ref 26.5–33)
MCHC RBC AUTO-ENTMCNC: 32.5 G/DL (ref 31.5–36.5)
MCV RBC AUTO: 89 FL (ref 78–100)
MONOCYTES # BLD AUTO: 0.7 10E3/UL (ref 0–1.3)
MONOCYTES NFR BLD AUTO: 8 %
NEUTROPHILS # BLD AUTO: 7.1 10E3/UL (ref 1.6–8.3)
NEUTROPHILS NFR BLD AUTO: 78 %
NRBC # BLD AUTO: 0 10E3/UL
NRBC BLD AUTO-RTO: 0 /100
P AXIS - MUSE: 66 DEGREES
PLATELET # BLD AUTO: 256 10E3/UL (ref 150–450)
POTASSIUM SERPL-SCNC: 4 MMOL/L (ref 3.4–5.3)
PR INTERVAL - MUSE: 184 MS
QRS DURATION - MUSE: 78 MS
QT - MUSE: 312 MS
QTC - MUSE: 435 MS
R AXIS - MUSE: 52 DEGREES
RBC # BLD AUTO: 4.62 10E6/UL (ref 3.8–5.2)
SODIUM SERPL-SCNC: 138 MMOL/L (ref 135–145)
SYSTOLIC BLOOD PRESSURE - MUSE: NORMAL MMHG
T AXIS - MUSE: 66 DEGREES
T4 FREE SERPL-MCNC: 1.4 NG/DL (ref 0.9–1.7)
TROPONIN T SERPL HS-MCNC: <6 NG/L
TROPONIN T SERPL HS-MCNC: <6 NG/L
TSH SERPL DL<=0.005 MIU/L-ACNC: 1.21 UIU/ML (ref 0.3–4.2)
VENTRICULAR RATE- MUSE: 117 BPM
WBC # BLD AUTO: 9 10E3/UL (ref 4–11)

## 2023-11-14 PROCEDURE — 80048 BASIC METABOLIC PNL TOTAL CA: CPT | Performed by: EMERGENCY MEDICINE

## 2023-11-14 PROCEDURE — 258N000003 HC RX IP 258 OP 636: Performed by: EMERGENCY MEDICINE

## 2023-11-14 PROCEDURE — 250N000013 HC RX MED GY IP 250 OP 250 PS 637: Performed by: STUDENT IN AN ORGANIZED HEALTH CARE EDUCATION/TRAINING PROGRAM

## 2023-11-14 PROCEDURE — 84443 ASSAY THYROID STIM HORMONE: CPT | Performed by: EMERGENCY MEDICINE

## 2023-11-14 PROCEDURE — 36415 COLL VENOUS BLD VENIPUNCTURE: CPT | Performed by: STUDENT IN AN ORGANIZED HEALTH CARE EDUCATION/TRAINING PROGRAM

## 2023-11-14 PROCEDURE — 84484 ASSAY OF TROPONIN QUANT: CPT | Performed by: EMERGENCY MEDICINE

## 2023-11-14 PROCEDURE — 93005 ELECTROCARDIOGRAM TRACING: CPT

## 2023-11-14 PROCEDURE — 85025 COMPLETE CBC W/AUTO DIFF WBC: CPT | Performed by: EMERGENCY MEDICINE

## 2023-11-14 PROCEDURE — 36415 COLL VENOUS BLD VENIPUNCTURE: CPT | Performed by: EMERGENCY MEDICINE

## 2023-11-14 PROCEDURE — 99284 EMERGENCY DEPT VISIT MOD MDM: CPT | Mod: 25

## 2023-11-14 PROCEDURE — 85379 FIBRIN DEGRADATION QUANT: CPT | Performed by: STUDENT IN AN ORGANIZED HEALTH CARE EDUCATION/TRAINING PROGRAM

## 2023-11-14 PROCEDURE — 84439 ASSAY OF FREE THYROXINE: CPT | Performed by: STUDENT IN AN ORGANIZED HEALTH CARE EDUCATION/TRAINING PROGRAM

## 2023-11-14 PROCEDURE — 84484 ASSAY OF TROPONIN QUANT: CPT | Mod: 91 | Performed by: EMERGENCY MEDICINE

## 2023-11-14 PROCEDURE — 96360 HYDRATION IV INFUSION INIT: CPT

## 2023-11-14 RX ORDER — HYDROXYZINE HYDROCHLORIDE 25 MG/1
25 TABLET, FILM COATED ORAL ONCE
Status: COMPLETED | OUTPATIENT
Start: 2023-11-14 | End: 2023-11-14

## 2023-11-14 RX ADMIN — SODIUM CHLORIDE 1000 ML: 9 INJECTION, SOLUTION INTRAVENOUS at 19:03

## 2023-11-14 RX ADMIN — HYDROXYZINE HYDROCHLORIDE 25 MG: 25 TABLET, FILM COATED ORAL at 19:03

## 2023-11-14 ASSESSMENT — ACTIVITIES OF DAILY LIVING (ADL): ADLS_ACUITY_SCORE: 35

## 2023-11-14 NOTE — ED PROVIDER NOTES
Emergency Department Attending Supervision Note  11/14/2023  5:56 PM      I evaluated this patient with BAILEY Aguero.       Briefly, the patient presented with evaluation of rapid heart rate.  She has been seen for this in the past including last week, as well as having a Holter in the past.  She does note some mild shortness of breath but no overt chest pain chest pressure or heaviness.  She was seen by endocrinology had her thyroid medication due to high levels.      On my exam, resting comfortably in bed.    Workup is notable for:    Labs Ordered and Resulted from Time of ED Arrival to Time of ED Departure   BASIC METABOLIC PANEL - Abnormal       Result Value    Sodium 138      Potassium 4.0      Chloride 103      Carbon Dioxide (CO2) 24      Anion Gap 11      Urea Nitrogen 13.5      Creatinine 0.88      GFR Estimate 75      Calcium 8.7      Glucose 129 (*)    TROPONIN T, HIGH SENSITIVITY - Normal    Troponin T, High Sensitivity <6     TROPONIN T, HIGH SENSITIVITY - Normal    Troponin T, High Sensitivity <6     TSH WITH FREE T4 REFLEX - Normal    TSH 1.21     T4 FREE - Normal    Free T4 1.40     D DIMER QUANTITATIVE - Normal    D-Dimer Quantitative 0.33     CBC WITH PLATELETS AND DIFFERENTIAL    WBC Count 9.0      RBC Count 4.62      Hemoglobin 13.3      Hematocrit 40.9      MCV 89      MCH 28.8      MCHC 32.5      RDW 13.0      Platelet Count 256      % Neutrophils 78      % Lymphocytes 12      % Monocytes 8      % Eosinophils 1      % Basophils 1      % Immature Granulocytes 0      NRBCs per 100 WBC 0      Absolute Neutrophils 7.1      Absolute Lymphocytes 1.1      Absolute Monocytes 0.7      Absolute Eosinophils 0.1      Absolute Basophils 0.1      Absolute Immature Granulocytes 0.0      Absolute NRBCs 0.0         In summary, my impression is sinus tachycardia, exact etiology based on labs and history is not entirely clear.  Certainly there is a component of anxiousness as patient seems somewhat preoccupied  with this she is not overtly symptomatic just keeps noticing it on her Fitbit.  Screening labs including D-dimer are again negative.  With IV fluids and a single dose of Atarax, her heart rate did improve into the 90s from the 120s.  She has no pulmonary complaints, chest imaging deferred.  We did offer her low-dose propranolol, and referral to cardiology, she has had negative cultures in the past.    Sinus tachycardia  Anxiousness      Disposition:  Discharge     Kenneth Angelo MD  [facility]  [facility]    5:56 PM 11/14/23           Kenneth Angelo MD  11/14/23 7904

## 2023-11-15 NOTE — ED PROVIDER NOTES
History     Chief Complaint:  Tachycardia       HPI   Jodi Leiva is a 58 year old female who presents to the emergency department with concerns of fast heart rate.  She wears a Fitbit that tells her her heart rate.  Over the past week, it has been intermittently 100-125 BPM.  This causes her stress and then she gets hung up on the rate.  She denies chest pain or palpitations or shortness of breath.  She was seen here 1 week ago and diagnosed with biliary colic.  She also had tachycardia at that point, however this resolved with IV fluids.  Her T4 was found to be elevated, and in consideration of the patient's recent thyroidectomy, her endocrinologist requested she hold her levothyroxine for 1 week.  Because of the biliary colic, the patient reports low appetite as she is anticipating pain, therefore she hasn't been eating much. She denies diarrhea but notes stools have  been light in color.  Denies dysuria, hematuria, or urinary frequency.  She denies fevers or chills.  She denies     Independent Historian:   None - Patient Only    Review of External Notes:   I reviewed the patient's telephone visit with endocrinology today.  Patient had been reporting feeling shaky, elevated heart rate, and weakness.  Dr. Shepard reported it would be unusual after skipping medication for a week to have continued hyperthyroid symptoms so he suggested primary care or urgent care or emergency department evaluation    Medications:    AMITRIPTYLINE HCL PO  CITALOPRAM HYDROBROMIDE PO  levothyroxine (SYNTHROID/LEVOTHROID) 125 MCG tablet  minoxidil (ROGAINE) 2 % external solution  order for DME  rosuvastatin (CRESTOR) 5 MG tablet        Past Medical History:    No past medical history on file.    Past Surgical History:    No past surgical history on file.     Physical Exam   Patient Vitals for the past 24 hrs:   BP Temp Temp src Pulse Resp SpO2 Height Weight   11/14/23 1918 131/88 -- -- 93 15 -- -- --   11/14/23 1900 134/75 -- -- 93  "17 97 % -- --   11/14/23 1837 -- -- -- -- 26 -- -- --   11/14/23 1835 131/88 -- -- 101 -- 95 % -- --   11/14/23 1533 (!) 141/82 -- -- 119 16 99 % -- --   11/14/23 1239 128/85 98  F (36.7  C) Oral (!) 121 16 98 % 1.676 m (5' 6\") 81.6 kg (180 lb)        Physical Exam  Vital signs and nursing notes reviewed.    General:  Alert and oriented, no acute distress. Resting on bed.   Skin: Skin is warm and dry. No rashes, lesions, or erythema. No diaphoresis.  HEENT:   Head: Normocephalic, atraumatic. Facial features symmetric.   Eyes: Conjunctiva pink, sclera white. EOMs grossly intact.   Ears: Auricles without lesion, erythema, or edema.   Nose: Symmetric with no discharge.  Mouth and throat: Lips are moist with no lesions or edema, Buccal and oropharyngeal mucosa is pink and moist without lesions or exudate. Uvula is midline.  Neck: Normal range of motion. Neck supple with no lymphadenopathy. No tracheal deviation.   CV:  Heart rate slightly tachycardic intermittently  range with no murmurs or extra heart sounds. 1+ radial and tibialis posterior pulses bilaterally. No peripheral edema.  Pulm/Chest: Chest wall expansion symmetric with no increased effort of breathing. Lungs clear and equal to auscultation bilaterally.   Abd: Bowel sounds present and physiologic. Abdomen is soft and nontender to palpation in all 4 quadrants with no guarding or rebound.   M/S: Moves all extremities spontaneously.  Psych: Slightly anxious, otherwise normal mood and affect. Behavior is normal.       Emergency Department Course   ECG  ECG taken at 1232, ECG read at 1240  Sinus tachycardia   Otherwise normal ECG   No changes as compared to prior, dated 11/07/23.  Rate 117 bpm. CT interval 184 ms. QRS duration 78 ms. QT/QTc 312/435 ms. P-R-T axes 66 52 66.     Imaging:  No orders to display      Laboratory:  Labs Ordered and Resulted from Time of ED Arrival to Time of ED Departure   BASIC METABOLIC PANEL - Abnormal       Result Value    " Sodium 138      Potassium 4.0      Chloride 103      Carbon Dioxide (CO2) 24      Anion Gap 11      Urea Nitrogen 13.5      Creatinine 0.88      GFR Estimate 75      Calcium 8.7      Glucose 129 (*)    TROPONIN T, HIGH SENSITIVITY - Normal    Troponin T, High Sensitivity <6     TROPONIN T, HIGH SENSITIVITY - Normal    Troponin T, High Sensitivity <6     TSH WITH FREE T4 REFLEX - Normal    TSH 1.21     T4 FREE - Normal    Free T4 1.40     D DIMER QUANTITATIVE - Normal    D-Dimer Quantitative 0.33     CBC WITH PLATELETS AND DIFFERENTIAL    WBC Count 9.0      RBC Count 4.62      Hemoglobin 13.3      Hematocrit 40.9      MCV 89      MCH 28.8      MCHC 32.5      RDW 13.0      Platelet Count 256      % Neutrophils 78      % Lymphocytes 12      % Monocytes 8      % Eosinophils 1      % Basophils 1      % Immature Granulocytes 0      NRBCs per 100 WBC 0      Absolute Neutrophils 7.1      Absolute Lymphocytes 1.1      Absolute Monocytes 0.7      Absolute Eosinophils 0.1      Absolute Basophils 0.1      Absolute Immature Granulocytes 0.0      Absolute NRBCs 0.0          Procedures   None    Emergency Department Course & Assessments:         Interventions:  Medications   sodium chloride 0.9% BOLUS 1,000 mL (0 mLs Intravenous Stopped 11/14/23 1949)   hydrOXYzine (ATARAX) tablet 25 mg (25 mg Oral $Given 11/14/23 1903)      Independent Interpretation (X-rays, CTs, rhythm strip):  None    Consultations/Discussion of Management or Tests:  None     Assessments:  ED Course as of 11/14/23 1939 Tue Nov 14, 2023 1814 I initially assessed the patient and obtained the above history and physical exam.       1845 I reassessed the patient and updated her on labs thus far.   1925 I reassessed the patient and updated her on results and plan of care.    1938 Dr. Angelo assessed the patient.      Social Determinants of Health affecting care:   None    Disposition:  The patient was discharged to home.     Impression & Plan    CMS Diagnoses:  None    Medical Decision Making:  Jodi Leiva is a 58 year old female who presents for evaluation of fast heart rate as documented on her fitness watch.  Heart rate has been mildly elevated intermittently over the past week.  She had her thyroid removed just over a month ago.  See HPI.  Remainder of vital signs are normal and her heart rate initially is between 89 and 109.  EKG shows sinus tachycardia without sinister arrhythmia.  She has no chest pain, sensation of palpitations, or shortness of breath.  She endorses a component of anxiety related to her recent thyroidectomy from thyroid cancer.  She also endorses getting obsessive about her heart rate on her watch which does not help.  Her appetite has been low and therefore she has not been eating much which may be playing a role in her heart rate.  On exam, she is well-appearing with clear lungs and normal heart sounds.  Laboratory work-up is reassuring including CBC without leukocytosis or anemia and BMP without any significant electrolyte, metabolic, or renal abnormalities.  Thyroid dysfunction was considered.  Her T4 and TSH are within normal range, improved from her ED visit last week.  ACS was considered, fortunately the patient's ECG is without evidence of acute ischemia or infarct and her troponin is negative at less than 6.  Patient can be ruled out for acute myocardial injury at this time.  Given her ongoing sinus tachycardia and relatively recent surgery, pulmonary embolism was considered.  Fortunately, the patient's D-dimer is negative, ruling this out at this time.  The patient is extremely reassured with laboratory results.  Her heart rate resolved into the 80s to 90s range with IV fluids and a small dose of hydroxyzine.  The patient was feeling significantly improved. She has a follow up appointment with primary care tomorrow and she sees endocrinology in 2 days. We discussed options for managing her symptoms at home. However, given the patient  has follow up appointments so soon, we will hold off on sending any low dose propranolol or hydroxyzine.  Primary care can determine if the patient needs cardiology consult.  Using reasonable clinical judgment, I do feel she is safe for discharge home at this time but is instructed to return to the ED should she develop chest pain, shortness of breath, palpitations, or any further concerns.  Plan was made clear to the patient who expresses her understanding and had her questions answered.    I staffed this patient with Dr. Angelo who agrees with the above assessment and plan.    Diagnosis:    ICD-10-CM    1. Sinus tachycardia  R00.0            Discharge Medications:  New Prescriptions    No medications on file      Delfina Leonard PA-C on 11/14/2023 at 11:01 PM           Delfina Leonard PA-C  11/14/23 1837

## 2023-11-15 NOTE — DISCHARGE INSTRUCTIONS
Work-up in the emergency department is reassuring.  Follow-up with your primary care provider and endocrinologist as scheduled.  Return to the ED should you develop chest pain, shortness of breath, palpitations, or any further concerns.  Discharge Instructions  Palpitations    Palpitations are an unusual awareness of your heartbeat. People often describe this as the heart skipping, fluttering, racing, irregular, or pounding. At this time, your provider has found no signs that your palpitations are due to a serious or life-threatening condition. However, sometimes there is a serious problem that does not show up right away.    Palpitations can be caused by caffeine, cigarettes, diet pills, energy drinks or supplements, other stimulants, and medications and street drugs. They can also be caused by anxiety, hormone conditions such as high thyroid, and other medical conditions. Sometimes they are a sign of abnormal rhythm in the heart. At this time, your provider did not find any dangerous cause of your symptoms.    Generally, every Emergency Department visit should have a follow-up clinic visit with either a primary or a specialty clinic/provider. Please follow-up as instructed by your emergency provider today.    Return to the Emergency Department if:  You get chest pain or tightness.  You are short of breath.  You get very weak or tired.  You pass out or faint.  Your heart rate is over 120 beats per minute for more than 10 minutes while you are resting.   You have anything else that worries you.    What can I do to help myself?  Fill any prescriptions the provider gave you and take them right away.   Follow your provider s instructions about the prescription medicines you are on. Sometimes the provider may tell you to stop taking a medicine or change the dose.  If you smoke, this may be a good time to quit! The less you can smoke, the better.  Do not use energy drinks, diet pills, or stimulants. Limit your use of  caffeine.  If you were given a prescription for medicine here today, be sure to read all of the information (including the package insert) that comes with your prescription.  This will include important information about the medicine, its side effects, and any warnings that you need to know about.  The pharmacist who fills the prescription can provide more information and answer questions you may have about the medicine.  If you have questions or concerns that the pharmacist cannot address, please call or return to the Emergency Department.     Remember that you can always come back to the Emergency Department if you are not able to see your regular provider in the amount of time listed above, if you get any new symptoms, or if there is anything that worries you.

## 2024-03-24 ENCOUNTER — HEALTH MAINTENANCE LETTER (OUTPATIENT)
Age: 59
End: 2024-03-24

## 2024-11-05 ENCOUNTER — HOSPITAL ENCOUNTER (OUTPATIENT)
Dept: MAMMOGRAPHY | Facility: CLINIC | Age: 59
Discharge: HOME | End: 2024-11-05
Attending: FAMILY MEDICINE | Admitting: FAMILY MEDICINE
Payer: COMMERCIAL

## 2024-11-05 DIAGNOSIS — Z12.31 VISIT FOR SCREENING MAMMOGRAM: ICD-10-CM

## 2024-11-05 PROCEDURE — 77063 BREAST TOMOSYNTHESIS BI: CPT

## 2025-05-25 ENCOUNTER — HEALTH MAINTENANCE LETTER (OUTPATIENT)
Age: 60
End: 2025-05-25